# Patient Record
Sex: MALE | Race: WHITE | URBAN - NONMETROPOLITAN AREA
[De-identification: names, ages, dates, MRNs, and addresses within clinical notes are randomized per-mention and may not be internally consistent; named-entity substitution may affect disease eponyms.]

---

## 2018-09-11 ENCOUNTER — OFFICE VISIT (OUTPATIENT)
Dept: FAMILY MEDICINE CLINIC | Facility: CLINIC | Age: 23
End: 2018-09-11
Payer: COMMERCIAL

## 2018-09-11 VITALS
WEIGHT: 154 LBS | SYSTOLIC BLOOD PRESSURE: 120 MMHG | HEART RATE: 60 BPM | BODY MASS INDEX: 24.17 KG/M2 | TEMPERATURE: 98 F | RESPIRATION RATE: 12 BRPM | HEIGHT: 66.75 IN | DIASTOLIC BLOOD PRESSURE: 60 MMHG

## 2018-09-11 DIAGNOSIS — R82.81 PYURIA: Primary | ICD-10-CM

## 2018-09-11 LAB
APPEARANCE: CLEAR
MULTISTIX LOT#: NORMAL NUMERIC
PH, URINE: 7 (ref 4.5–8)
SPECIFIC GRAVITY: 1.01 (ref 1–1.03)
URINE-COLOR: YELLOW
UROBILINOGEN,SEMI-QN: 0.2 MG/DL (ref 0–1.9)

## 2018-09-11 PROCEDURE — 99203 OFFICE O/P NEW LOW 30 MIN: CPT | Performed by: FAMILY MEDICINE

## 2018-09-11 PROCEDURE — 87591 N.GONORRHOEAE DNA AMP PROB: CPT | Performed by: FAMILY MEDICINE

## 2018-09-11 PROCEDURE — 81003 URINALYSIS AUTO W/O SCOPE: CPT | Performed by: FAMILY MEDICINE

## 2018-09-11 PROCEDURE — 87491 CHLMYD TRACH DNA AMP PROBE: CPT | Performed by: FAMILY MEDICINE

## 2018-09-11 RX ORDER — AZITHROMYCIN 250 MG/1
TABLET, FILM COATED ORAL
Qty: 6 TABLET | Refills: 0 | Status: SHIPPED | OUTPATIENT
Start: 2018-09-11 | End: 2020-11-19 | Stop reason: ALTCHOICE

## 2018-09-11 NOTE — PROGRESS NOTES
HPI:    Patient ID: All Tavarez is a 23year old male. yest discharge penis  Episode pain  W/o dysuria / freq  W/o rashes  Partner x 3 months  HPI    Review of Systems   Constitutional: Negative for chills and fever. Gastrointestinal: Negative.     Genit

## 2018-09-12 LAB
C TRACH DNA SPEC QL NAA+PROBE: NEGATIVE
N GONORRHOEA DNA SPEC QL NAA+PROBE: NEGATIVE

## 2020-11-19 ENCOUNTER — OFFICE VISIT (OUTPATIENT)
Dept: FAMILY MEDICINE CLINIC | Facility: CLINIC | Age: 25
End: 2020-11-19
Payer: COMMERCIAL

## 2020-11-19 VITALS
SYSTOLIC BLOOD PRESSURE: 120 MMHG | TEMPERATURE: 98 F | DIASTOLIC BLOOD PRESSURE: 78 MMHG | BODY MASS INDEX: 25.07 KG/M2 | HEART RATE: 85 BPM | OXYGEN SATURATION: 99 % | RESPIRATION RATE: 18 BRPM | WEIGHT: 156 LBS | HEIGHT: 66 IN

## 2020-11-19 DIAGNOSIS — Z20.822 SUSPECTED COVID-19 VIRUS INFECTION: Primary | ICD-10-CM

## 2020-11-19 DIAGNOSIS — J06.9 VIRAL URI: ICD-10-CM

## 2020-11-19 PROCEDURE — 3008F BODY MASS INDEX DOCD: CPT | Performed by: NURSE PRACTITIONER

## 2020-11-19 PROCEDURE — 99072 ADDL SUPL MATRL&STAF TM PHE: CPT | Performed by: NURSE PRACTITIONER

## 2020-11-19 PROCEDURE — 99213 OFFICE O/P EST LOW 20 MIN: CPT | Performed by: NURSE PRACTITIONER

## 2020-11-19 PROCEDURE — 3074F SYST BP LT 130 MM HG: CPT | Performed by: NURSE PRACTITIONER

## 2020-11-19 PROCEDURE — 3078F DIAST BP <80 MM HG: CPT | Performed by: NURSE PRACTITIONER

## 2020-11-19 NOTE — PATIENT INSTRUCTIONS
Coronavirus Disease 2019 (COVID-19)     [facility] is committed to the safety and well-being of our patients, members, employees, and communities.  As concerns arise about the new strain of coronavirus that causes COVID-19, [facility] 4. If you have a medical appointment, call the healthcare provider ahead of time and tell them that you have or may have COVID-19.  5. For medical emergencies, call 911 and notify the dispatch personnel that you have or may have COVID-19.   6. Cover your c · At least 10 days have passed since symptoms first appeared OR if asymptomatic patient or date of symptom onset is unclear then use 10 days post COVID test date.    · At least 20 days have passed for severe illness (requiring hospitalization) OR if you are *Some people will be required to have a repeat COVID-19 test in order to be eligible to donate. If you’re instructed by [facility] that a repeat test is required, please contact the [address] COVID-19 Nurse Triage Line at [phone].     Additional Inf · You may use acetaminophen or ibuprofen to control pain and fever, unless another medicine was prescribed. If you have chronic liver or kidney disease, have ever had a stomach ulcer or gastrointestinal bleeding, or are taking blood-thinning medicines, chritso © 9584-4038 [employer] [address] [address]. [facility], [facility] [place]. All rights reserved. This information is not intended as a substitute for professional medical care. Always follow your healthcare professional's instructions.

## 2020-11-19 NOTE — PROGRESS NOTES
CHIEF COMPLAINT:   Patient presents with:  Cold: X 2 days ago, slight cough runny nose, fatigue       HPI:   Manuel Ohara is a 23year old male who presents for upper respiratory symptoms for  2 days. Patient reports cough, runny nose, and fatigue.  Symptoms Comfort care as described in Patient Instructions. To follow up for any worsening symptoms. To go to the ER for any severe symptoms such as chest pain or difficulty breathing.    Pt was educated on CDC recommendations for self isolation and provided with th Patients with pending COVID-19 test results should follow all care and home isolation instructions. Your test results will be called to you from an [facility] representative.  If you have not received a call within 2 business days, please call your pr If you are awaiting test results or are confirmed positive for COVID -19, and your symptoms worsen at home with symptoms such as: extreme weakness, difficult breathing, or unrelenting fevers greater than 100.4 degrees Fahrenheit, you should contact your he [facility], in conjunction with [facility], is looking for patients who have recovered from COVID-19 and would be interested in donating plasma.     Convalescent plasma is a component of blood that, in people who have recovered from COVID-19, [employer]at  https://www.cdc.gov/coronavirus/2019-ncov/    Viral Upper Respiratory Illness (Adult)    You have a viral upper respiratory illness (URI), which is another t · Over-the-counter cold medicines will not shorten the length of time you’re sick, but they may be helpful for the following symptoms: cough, sore throat, and nasal and sinus congestion.  If you take prescription medicines, ask your healthcare provider or p

## 2020-11-20 ENCOUNTER — OFFICE VISIT (OUTPATIENT)
Dept: URGENT CARE | Age: 25
End: 2020-11-20

## 2020-11-20 VITALS
OXYGEN SATURATION: 99 % | RESPIRATION RATE: 16 BRPM | SYSTOLIC BLOOD PRESSURE: 96 MMHG | TEMPERATURE: 97.3 F | DIASTOLIC BLOOD PRESSURE: 60 MMHG | HEART RATE: 96 BPM

## 2020-11-20 DIAGNOSIS — Z20.822 SUSPECTED COVID-19 VIRUS INFECTION: Primary | ICD-10-CM

## 2020-11-20 LAB — SARS-COV-2 AG RESP QL IA.RAPID: NOT DETECTED

## 2020-11-20 PROCEDURE — 87426 SARSCOV CORONAVIRUS AG IA: CPT | Performed by: FAMILY MEDICINE

## 2020-11-20 PROCEDURE — 99202 OFFICE O/P NEW SF 15 MIN: CPT | Performed by: FAMILY MEDICINE

## 2021-08-24 ENCOUNTER — OFFICE VISIT (OUTPATIENT)
Dept: FAMILY MEDICINE CLINIC | Facility: CLINIC | Age: 26
End: 2021-08-24
Payer: COMMERCIAL

## 2021-08-24 VITALS
DIASTOLIC BLOOD PRESSURE: 78 MMHG | SYSTOLIC BLOOD PRESSURE: 118 MMHG | TEMPERATURE: 98 F | OXYGEN SATURATION: 98 % | RESPIRATION RATE: 16 BRPM | HEART RATE: 56 BPM | BODY MASS INDEX: 25.07 KG/M2 | WEIGHT: 156 LBS | HEIGHT: 66 IN

## 2021-08-24 DIAGNOSIS — R43.0 LOSS OF SMELL: ICD-10-CM

## 2021-08-24 DIAGNOSIS — U07.1 COVID: ICD-10-CM

## 2021-08-24 DIAGNOSIS — R43.2 LOSS OF TASTE: Primary | ICD-10-CM

## 2021-08-24 LAB
OPERATOR ID: ABNORMAL
POCT LOT NUMBER: ABNORMAL
RAPID SARS-COV-2 BY PCR: DETECTED

## 2021-08-24 PROCEDURE — 3074F SYST BP LT 130 MM HG: CPT | Performed by: PHYSICIAN ASSISTANT

## 2021-08-24 PROCEDURE — 3008F BODY MASS INDEX DOCD: CPT | Performed by: PHYSICIAN ASSISTANT

## 2021-08-24 PROCEDURE — 99213 OFFICE O/P EST LOW 20 MIN: CPT | Performed by: PHYSICIAN ASSISTANT

## 2021-08-24 PROCEDURE — 3078F DIAST BP <80 MM HG: CPT | Performed by: PHYSICIAN ASSISTANT

## 2021-08-24 PROCEDURE — U0002 COVID-19 LAB TEST NON-CDC: HCPCS | Performed by: PHYSICIAN ASSISTANT

## 2021-08-24 NOTE — PROGRESS NOTES
CHIEF COMPLAINT:     Patient presents with: Other: loss of taste and smell. HPI:   Denny Newsome is a 23year old male who presents with loss of taste and smell and some nasal congestion since yesterday.    No known recent covid exposures but he is a p taste and smell. ). Symptoms are consistent with:      ASSESSMENT:  Loss of taste  (primary encounter diagnosis)  Loss of smell  Covid      PLAN: Covid Test Rapid ID Now is positive.     He does qualify for PAB therapy with a bmi of 25.18 but he declines it

## 2022-04-04 ENCOUNTER — LAB REQUISITION (OUTPATIENT)
Dept: LAB | Age: 27
End: 2022-04-04

## 2022-04-04 ENCOUNTER — IMAGING SERVICES (OUTPATIENT)
Dept: GENERAL RADIOLOGY | Age: 27
End: 2022-04-04
Attending: PHYSICIAN ASSISTANT

## 2022-04-04 DIAGNOSIS — Z02.1 PRE-EMPLOYMENT HEALTH SCREENING EXAMINATION: Primary | ICD-10-CM

## 2022-04-04 DIAGNOSIS — Z02.1 PRE-EMPLOYMENT HEALTH SCREENING EXAMINATION: ICD-10-CM

## 2022-04-04 DIAGNOSIS — Z02.1 ENCOUNTER FOR PRE-EMPLOYMENT EXAMINATION: ICD-10-CM

## 2022-04-04 PROCEDURE — 80061 LIPID PANEL: CPT | Performed by: CLINICAL MEDICAL LABORATORY

## 2022-04-04 PROCEDURE — 71046 X-RAY EXAM CHEST 2 VIEWS: CPT | Performed by: RADIOLOGY

## 2022-04-04 PROCEDURE — PSEU8250 COMPREHENSIVE METABOLIC PANEL: Performed by: CLINICAL MEDICAL LABORATORY

## 2022-04-04 PROCEDURE — 85025 COMPLETE CBC W/AUTO DIFF WBC: CPT | Performed by: CLINICAL MEDICAL LABORATORY

## 2022-04-04 PROCEDURE — PSEU8135 LIPID PANEL WITH REFLEX: Performed by: CLINICAL MEDICAL LABORATORY

## 2022-04-04 PROCEDURE — 80053 COMPREHEN METABOLIC PANEL: CPT | Performed by: CLINICAL MEDICAL LABORATORY

## 2022-04-05 LAB
ALBUMIN SERPL-MCNC: 4.6 G/DL (ref 3.6–5.1)
ALBUMIN/GLOB SERPL: 1.7 {RATIO} (ref 1–2.4)
ALP SERPL-CCNC: 35 UNITS/L (ref 45–117)
ALT SERPL-CCNC: 23 UNITS/L
ANION GAP SERPL CALC-SCNC: 9 MMOL/L (ref 10–20)
AST SERPL-CCNC: 21 UNITS/L
BASOPHILS # BLD: 0 K/MCL (ref 0–0.3)
BASOPHILS NFR BLD: 1 %
BILIRUB SERPL-MCNC: 0.7 MG/DL (ref 0.2–1)
BUN SERPL-MCNC: 12 MG/DL (ref 6–20)
BUN/CREAT SERPL: 13 (ref 7–25)
CALCIUM SERPL-MCNC: 9.9 MG/DL (ref 8.4–10.2)
CHLORIDE SERPL-SCNC: 108 MMOL/L (ref 98–107)
CHOLEST SERPL-MCNC: 141 MG/DL
CHOLEST/HDLC SERPL: 3.5 {RATIO}
CO2 SERPL-SCNC: 28 MMOL/L (ref 21–32)
CREAT SERPL-MCNC: 0.94 MG/DL (ref 0.67–1.17)
DEPRECATED RDW RBC: 38.7 FL (ref 39–50)
EOSINOPHIL # BLD: 0.1 K/MCL (ref 0–0.5)
EOSINOPHIL NFR BLD: 2 %
ERYTHROCYTE [DISTWIDTH] IN BLOOD: 13 % (ref 11–15)
FASTING DURATION TIME PATIENT: 0 HOURS (ref 0–999)
FASTING DURATION TIME PATIENT: 0 HOURS (ref 0–999)
GFR SERPLBLD BASED ON 1.73 SQ M-ARVRAT: >90 ML/MIN
GLOBULIN SER-MCNC: 2.7 G/DL (ref 2–4)
GLUCOSE SERPL-MCNC: 91 MG/DL (ref 70–99)
HCT VFR BLD CALC: 47 % (ref 39–51)
HDLC SERPL-MCNC: 40 MG/DL
HGB BLD-MCNC: 15.6 G/DL (ref 13–17)
IMM GRANULOCYTES # BLD AUTO: 0 K/MCL (ref 0–0.2)
IMM GRANULOCYTES # BLD: 0 %
LDLC SERPL CALC-MCNC: 77 MG/DL
LYMPHOCYTES # BLD: 2.6 K/MCL (ref 1–4.8)
LYMPHOCYTES NFR BLD: 49 %
MCH RBC QN AUTO: 27.3 PG (ref 26–34)
MCHC RBC AUTO-ENTMCNC: 33.2 G/DL (ref 32–36.5)
MCV RBC AUTO: 82.3 FL (ref 78–100)
MONOCYTES # BLD: 0.5 K/MCL (ref 0.3–0.9)
MONOCYTES NFR BLD: 10 %
NEUTROPHILS # BLD: 2 K/MCL (ref 1.8–7.7)
NEUTROPHILS NFR BLD: 38 %
NONHDLC SERPL-MCNC: 101 MG/DL
NRBC BLD MANUAL-RTO: 0 /100 WBC
PLATELET # BLD AUTO: 151 K/MCL (ref 140–450)
POTASSIUM SERPL-SCNC: 4.2 MMOL/L (ref 3.4–5.1)
PROT SERPL-MCNC: 7.3 G/DL (ref 6.4–8.2)
RBC # BLD: 5.71 MIL/MCL (ref 4.5–5.9)
SODIUM SERPL-SCNC: 141 MMOL/L (ref 135–145)
TRIGL SERPL-MCNC: 119 MG/DL
WBC # BLD: 5.3 K/MCL (ref 4.2–11)

## 2022-04-06 ENCOUNTER — IMAGING SERVICES (OUTPATIENT)
Dept: GENERAL RADIOLOGY | Age: 27
End: 2022-04-06
Attending: PHYSICIAN ASSISTANT

## 2022-04-06 DIAGNOSIS — Z02.1 PRE-EMPLOYMENT HEALTH SCREENING EXAMINATION: Primary | ICD-10-CM

## 2022-04-06 DIAGNOSIS — Z02.1 PRE-EMPLOYMENT HEALTH SCREENING EXAMINATION: ICD-10-CM

## 2024-11-11 ENCOUNTER — PATIENT OUTREACH (OUTPATIENT)
Dept: CASE MANAGEMENT | Age: 29
End: 2024-11-11

## 2024-11-11 NOTE — PROCEDURES
The office order for PCP removal request is Approved and finalized on November 11, 2024.    Removed Shady Crenshaw DO as the patient's Primary Care Physician

## 2024-12-12 ENCOUNTER — OFFICE VISIT (OUTPATIENT)
Dept: FAMILY MEDICINE CLINIC | Facility: CLINIC | Age: 29
End: 2024-12-12
Payer: COMMERCIAL

## 2024-12-12 VITALS
DIASTOLIC BLOOD PRESSURE: 80 MMHG | HEART RATE: 119 BPM | OXYGEN SATURATION: 97 % | HEIGHT: 67 IN | BODY MASS INDEX: 23.86 KG/M2 | TEMPERATURE: 99 F | SYSTOLIC BLOOD PRESSURE: 112 MMHG | WEIGHT: 152 LBS | RESPIRATION RATE: 16 BRPM

## 2024-12-12 DIAGNOSIS — Z00.00 WELLNESS EXAMINATION: Primary | ICD-10-CM

## 2024-12-12 DIAGNOSIS — Z13.220 LIPID SCREENING: ICD-10-CM

## 2024-12-12 DIAGNOSIS — F41.9 ANXIETY: ICD-10-CM

## 2024-12-12 RX ORDER — ESCITALOPRAM OXALATE 10 MG/1
10 TABLET ORAL DAILY
Qty: 30 TABLET | Refills: 0 | Status: SHIPPED | OUTPATIENT
Start: 2024-12-12

## 2024-12-12 NOTE — PROGRESS NOTES
Neno Cha is a 29 year old male.   Chief Complaint   Patient presents with    Physical     Work physical     Stress     And anxiety from work. Not diagnosed with anxiety. For a couple of months      HPI:1.  Cpx: 29-year-old male [facility] comes in secondary to needing a physical as well as having anxiety while at work.  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  Patient states that he does not have any recent to be anxious.  Patient states he has not had any bad experiences at the job he has not, he has not been shot at or had any negative interaction so far.  Patient states that whenever he is in high risk encounter he is beginning to get extremely nervous and does not trust his instincts.  Patient states that he has a good family life with 2 children no stressors at home and feels like his work overall is okay except for when he has no specific encounters.  Given the nature of his job very unpredictable.  No homicidal thoughts no suicidal thoughts.    No past medical history on file.    No past surgical history on file.    No family history on file.    Social History:    Social History     Socioeconomic History    Marital status: Single   Tobacco Use    Smoking status: Never     Passive exposure: Past    Smokeless tobacco: Never   Vaping Use    Vaping status: Every Day    Substances: Nicotine, Flavoring    Devices: Disposable   Substance and Sexual Activity    Alcohol use: Yes     Comment: occasionally    Drug use: No    Sexual activity: Yes       ALLERGIES:  Allergies[1]   CURRENT MEDS:  Current Outpatient Medications   Medication Sig Dispense Refill    escitalopram (LEXAPRO) 10 MG Oral Tab Take 1 tablet (10 mg total) by mouth daily. 30 tablet 0        REVIEW OF SYSTEMS:   GENERAL HEALTH: Feels well overall  SKIN: Denies any unusual skin lesions or rashes  EYES: No visual complaints or deficits  HEENT: Denies nasal  congestion, sinus pain or sore throat; hearing loss negative  RESPIRATORY: Denies shortness of breath, wheezing or cough   CARDIOVASCULAR: Denies chest pain or MCELROY; no palpitations   GI: Denies nausea, vomiting, constipation, diarrhea; no rectal bleeding; no heartburn  GENITAL/: No urinary symptoms  MUSCULOSKELETAL: No joint complaints upper or lower extremities  NEURO: No sensory or motor complaint  PSYCHE: No symptoms of depression or anxiety  HEMATOLOGY: No easy bleeding, bruising,   ENDOCRINE: No thyroid symptoms      PHYSICAL EXAM:   GENERAL: Well developed, well nourished, in no apparent distress, A&O X 3  SKIN: No rashes, no suspicious lesions  EYES: PERRL, EOMI, sclera anicteric, conjunctiva normal  HEENT: Normocephalic; normal nose, pharynx and TM's  NECK: supple; Full range of motion , no JVD, thyroid not enlarged, no carotid bruits  RESPIRATORY: Bilaterally  clear to auscultation, no rales, no wheezing, good A/E bilaterally  CARDIOVASCULAR: S1, S2 normal, RRR; no S3, no S4; no click; no murmur  ABDOMEN:  Soft, nondistended; no HSM; no masses; nontender, no guarding  GENITAL/: deferred   RECTAL:defered  LYMPHATIC: No lymphadenopathy  MUSCULOSKELETAL: Full painless ROM of U/E and L/E joints,no joint effusion  EXTREMITIES: No cyanosis, clubbing or edema, peripheral pulses intact  NEUROLOGIC:Motor power 5/5 all over, Cranial nerves 2-12: unremarcable; no sensory deficits  PSYCHIATRIC: Alert and oriented x 3; affect appropriate    ASSESSMENT/PLAN:     Diagnoses and all orders for this visit:    Wellness examination  -     Comp Metabolic Panel (14); Future  -     Lipid Panel; Future  -     HIV AG AB Combo [E]; Future  -     HCV Antibody [E]; Future    Lipid screening  -     Lipid Panel; Future    Anxiety  -     [facility] Referral - In Network  -     OFFICE/OUTPT VISIT,EST,LEVL III    Other orders  -     escitalopram (LEXAPRO) 10 MG Oral Tab; Take 1 tablet (10 mg total) by mouth daily.  -      Cancel: TdaP (Adacel, Boostrix) [36427]      Discussed preventative care, fasting labs.  Recommend updating his tetanus given his line of work as well as HIV screen and hepatitis C screen.    Long discussion with patient regarding his anxiety.  Recommend starting him on Lexapro 10 mg daily for a week and consider increasing to 20 if that does not work well for him and also to consider a therapist.  I highly advised to discuss it with his superiors to see what other help he can get in the form of EAP counseling through the [facility].  Patient is agreeable with plan will follow-up in 3 weeks with an update on his anxiety.       The patient verbalizes understanding of diagnosis, treatment plan and agrees to the plan of care.           [1] No Known Allergies

## 2024-12-16 ENCOUNTER — TELEPHONE (OUTPATIENT)
Age: 29
End: 2024-12-16

## 2024-12-16 NOTE — TELEPHONE ENCOUNTER
Hello - I am reaching out from the [facility] Navigation department, following up on an order from your provider's office to assist in connecting you with resources for care. If you would like to discuss this further, please give us a call at [phone], or for more immediate assistance you can contact our 24-hour help line at [phone]. We look forward to hearing from you soon.

## 2024-12-23 ENCOUNTER — TELEPHONE (OUTPATIENT)
Age: 29
End: 2024-12-23

## 2024-12-23 NOTE — TELEPHONE ENCOUNTER
Hello,     The [facility] Navigation team has attempted to reach you regarding an order from Dr. Abner Mota's  office. We are reaching out in order to assist you in coordinating care and resources that may meet your needs. Please give our office a call at [phone]. For more immediate assistance you can contact our 24-hour help line at [phone]. We look forward to hearing from you soon.

## 2025-01-08 RX ORDER — ESCITALOPRAM OXALATE 10 MG/1
10 TABLET ORAL DAILY
Qty: 30 TABLET | Refills: 0 | Status: SHIPPED | OUTPATIENT
Start: 2025-01-08

## 2025-01-08 NOTE — TELEPHONE ENCOUNTER
Psychiatric Non-Scheduled (Anti-Anxiety) Dzodrj3201/08/2025 12:36 AM   Protocol Details In person appointment or virtual visit in the past 6 mos or appointment in next 3 mos    Depression Screening completed within the past 12 months    Medication is active on med list         no

## 2025-02-14 DIAGNOSIS — F41.9 ANXIETY: ICD-10-CM

## 2025-02-14 RX ORDER — PROPRANOLOL HYDROCHLORIDE 10 MG/1
10 TABLET ORAL 3 TIMES DAILY
Qty: 90 TABLET | Refills: 0 | Status: SHIPPED | OUTPATIENT
Start: 2025-02-14

## 2025-02-14 NOTE — TELEPHONE ENCOUNTER
Pt failed refill protocol for the following reasons:    Requested Renewals     Name from pharmacy: PROPRANOLOL 10 MG TABLET         Will file in chart as: PROPRANOLOL 10 MG Oral Tab    Sig: TAKE 1 TABLET BY MOUTH THREE TIMES A DAY    Disp: 90 tablet    Refills: 0 (Pharmacy requested: Not specified)    Start: 2/14/2025    Class: Normal    Non-formulary For: Anxiety    Last ordered: 4 weeks ago (1/17/2025) by Dmitriy Mota MD    Last refill: 1/17/2025    Rx #: [id number]    Hypertension Medications Protocol Yqnqhk1602/14/2025 01:02 AM   Protocol Details CMP or BMP in past 12 months    EGFRCR or GFRNAA > 50    Last BP reading less than 140/90    In person appointment or virtual visit in the past 12 mos or appointment in next 3 mos    Medication is active on med list      To be filled at: [facility] [address] [address] [phone], [phone]          Last refill: 1/17/25  Last appt: 1/17/25  Next appt: No future appointments.      Forward to Dr. Levine, please advise on refills. Thank you.

## 2025-03-07 ENCOUNTER — TELEPHONE (OUTPATIENT)
Dept: FAMILY MEDICINE CLINIC | Facility: CLINIC | Age: 30
End: 2025-03-07

## 2025-03-07 NOTE — TELEPHONE ENCOUNTER
Lab Frequency Next Occurrence   Comp Metabolic Panel (14) Once 12/12/2024   Lipid Panel Once 12/12/2024   HIV AG AB Combo [E] Once 12/12/2024   HCV Antibody [E] Once 12/12/2024     Letter mailed to patient reminding them they have outstanding orders.

## 2025-03-22 DIAGNOSIS — F41.9 ANXIETY: ICD-10-CM

## 2025-03-22 NOTE — TELEPHONE ENCOUNTER
Hypertension Medications Protocol Jcekqu6303/22/2025 06:56 AM   Protocol Details CMP or BMP in past 12 months    EGFRCR or GFRNAA > 50    Last BP reading less than 140/90    In person appointment or virtual visit in the past 12 mos or appointment in next 3 mos    Medication is active on med list          Last refill:   PROPRANOLOL 10 MG Oral Tab 90 tablet 0 2/14/2025     Last Visit: 1/17/25    Next Visit: No future appointments.      Forward to Dr. Levine please advise on refills. Thanks.

## 2025-03-23 RX ORDER — PROPRANOLOL HYDROCHLORIDE 10 MG/1
10 TABLET ORAL 3 TIMES DAILY
Qty: 90 TABLET | Refills: 0 | Status: SHIPPED | OUTPATIENT
Start: 2025-03-23

## 2025-04-27 DIAGNOSIS — F41.9 ANXIETY: ICD-10-CM

## 2025-04-28 RX ORDER — PROPRANOLOL HYDROCHLORIDE 10 MG/1
10 TABLET ORAL 3 TIMES DAILY
Qty: 90 TABLET | Refills: 0 | Status: SHIPPED | OUTPATIENT
Start: 2025-04-28

## 2025-04-28 NOTE — TELEPHONE ENCOUNTER
Hypertension Medications Protocol Feqjuv2804/27/2025 07:04 AM   Protocol Details CMP or BMP in past 12 months    EGFRCR or GFRNAA > 50    Last BP reading less than 140/90    In person appointment or virtual visit in the past 12 mos or appointment in next 3 mos    Medication is active on med list       Last refill:   Medication Quantity Refills Start End   PROPRANOLOL 10 MG Oral Tab 90 tablet 0 3/23/2025      Last Visit: 1/17/25    Next Visit: No future appointments.      Forward to Dr. Levine please advise on refills. Thanks.

## 2025-06-07 DIAGNOSIS — F41.9 ANXIETY: ICD-10-CM

## 2025-06-07 RX ORDER — PROPRANOLOL HYDROCHLORIDE 10 MG/1
10 TABLET ORAL 3 TIMES DAILY
Qty: 90 TABLET | Refills: 0 | Status: SHIPPED | OUTPATIENT
Start: 2025-06-07

## 2025-06-07 NOTE — TELEPHONE ENCOUNTER
Hypertension Medications Protocol Teuvxm4306/07/2025 07:03 AM   Protocol Details CMP or BMP in past 12 months    EGFRCR or GFRNAA > 50    Last BP reading less than 140/90    In person appointment or virtual visit in the past 12 mos or appointment in next 3 mos    Medication is active on med list      Routing to provider per protocol.   PROPRANOLOL 10 MG Oral Tab   Last refilled on 4/28/25 for #90  with 0 rf.   Last labs 8/24/21.   Last seen on 1/17/25.     No future appointments.       Thank you.

## 2025-07-17 DIAGNOSIS — F41.9 ANXIETY: ICD-10-CM

## 2025-07-21 RX ORDER — PROPRANOLOL HYDROCHLORIDE 10 MG/1
10 TABLET ORAL 3 TIMES DAILY
Qty: 90 TABLET | Refills: 0 | Status: SHIPPED | OUTPATIENT
Start: 2025-07-21

## 2025-07-21 NOTE — TELEPHONE ENCOUNTER
Pt failed refill protocol for the following reasons:    Hypertension Medications Protocol Tyeohz1807/17/2025 12:27 AM   Protocol Details CMP or BMP in past 12 months    EGFRCR or GFRNAA > 50            Last refill: 6/07/25 #90 with 0 refills  Last appt: 1/17/2025   Next appt: No future appointments.      Forward to Dr. Levine, please advise on refills. Thank you.

## (undated) NOTE — LETTER
[address]  [id number]  [phone]     Patient: Seda Ortiz   YOB: 1995   Date of Visit: 11/19/2020     Dear Employer,        November 19, 2020    At [facility], kelly pineda ? At least 24 hours have passed since resolution of fever without the use of fever-reducing medications and  ? Other symptoms have improved.   Persons infected with SARS-CoV-2 who never develop COVID-19 symptoms may discontinue isolation and other precautio

## (undated) NOTE — LETTER
Neno Cha   [address] [facility] [address]           Dear Neno Cha     Our records indicate that you have outstanding lab work and or testing that was ordered for you and has not yet been completed:  Lab Frequency Next Occurrence   Comp Metabolic Panel (14) Once 12/12/2024   Lipid Panel Once 12/12/2024   HIV AG AB Combo [E] Once 12/12/2024   HCV Antibody [E] Once 12/12/2024      To provide you with the best possible care, please complete these orders at your earliest convenience. If you have recently completed these orders please disregard this letter.     If you have any questions please call the office at [phone].     Thank you,     [facility]   03/07/25